# Patient Record
(demographics unavailable — no encounter records)

---

## 2024-12-23 NOTE — END OF VISIT
[Time Spent: ___ minutes] : I have spent [unfilled] minutes of time on the encounter which excludes teaching and separately reported services. [FreeTextEntry3] :  All medical records entered made by the scribe were at my Dr. Houston Claire, discretion and personally dictated by me on Dec 23 2024  6:40PM. I have reviewed the chart and agree that the record accuracy reflects my personal performance of the history, physical exam, assessment and plan. I have also personally directed, reviewed and agreed with the chart.

## 2024-12-23 NOTE — PHYSICAL EXAM
[Well Developed] : well developed [Well Nourished] : well nourished [No Acute Distress] : no acute distress [Normal Conjunctiva] : normal conjunctiva [Normal Venous Pressure] : normal venous pressure [No Carotid Bruit] : no carotid bruit [Normal S1, S2] : normal S1, S2 [No Rub] : no rub [No Gallop] : no gallop [Murmur] : murmur [Clear Lung Fields] : clear lung fields [Good Air Entry] : good air entry [No Respiratory Distress] : no respiratory distress  [Soft] : abdomen soft [Non Tender] : non-tender [No Masses/organomegaly] : no masses/organomegaly [Normal Bowel Sounds] : normal bowel sounds [No Cyanosis] : no cyanosis [No Clubbing] : no clubbing [No Varicosities] : no varicosities [No Rash] : no rash [No Skin Lesions] : no skin lesions [Moves all extremities] : moves all extremities [No Focal Deficits] : no focal deficits [Normal Speech] : normal speech [Alert and Oriented] : alert and oriented [Normal memory] : normal memory [de-identified] : 2/6 ZECHARIAH-> Axilla [de-identified] : Limping L ankle; wrist brace- L [de-identified] : (+) edema L ankle

## 2024-12-23 NOTE — DISCUSSION/SUMMARY
[Hyperlipidemia] : hyperlipidemia [Stable] : stable [None] : There are no changes in medication management [Diet Modification] : diet modification [Patient] : the patient [FreeTextEntry1] : Mass on back - Chest CT ordered to better define the mass. Further recommendations post imaging to be forthcoming. Cardiac Murmur - stable; no further interventions at this time (see echo) Vitamin D deficiency, and Diabetes - Blood work drawn today. Maintain a low caloric, low sodium, low cholesterol diet. dietary counseling given, diet and exercise discussed in detail.